# Patient Record
Sex: FEMALE | Race: WHITE | NOT HISPANIC OR LATINO | Employment: FULL TIME | URBAN - METROPOLITAN AREA
[De-identification: names, ages, dates, MRNs, and addresses within clinical notes are randomized per-mention and may not be internally consistent; named-entity substitution may affect disease eponyms.]

---

## 2017-04-04 ENCOUNTER — OFFICE VISIT (OUTPATIENT)
Dept: INTERNAL MEDICINE | Facility: CLINIC | Age: 47
End: 2017-04-04

## 2017-04-04 VITALS
BODY MASS INDEX: 22.02 KG/M2 | DIASTOLIC BLOOD PRESSURE: 74 MMHG | WEIGHT: 162.6 LBS | SYSTOLIC BLOOD PRESSURE: 118 MMHG | HEIGHT: 72 IN | HEART RATE: 69 BPM | OXYGEN SATURATION: 99 %

## 2017-04-04 DIAGNOSIS — R29.818 SUSPECTED SLEEP APNEA: ICD-10-CM

## 2017-04-04 DIAGNOSIS — Z00.00 ANNUAL PHYSICAL EXAM: Primary | ICD-10-CM

## 2017-04-04 DIAGNOSIS — R53.82 CHRONIC FATIGUE: ICD-10-CM

## 2017-04-04 LAB
ALBUMIN SERPL-MCNC: 4.6 G/DL (ref 3.2–4.8)
ALBUMIN/GLOB SERPL: 1.5 G/DL (ref 1.5–2.5)
ALP SERPL-CCNC: 52 U/L (ref 25–100)
ALT SERPL W P-5'-P-CCNC: 20 U/L (ref 7–40)
ANION GAP SERPL CALCULATED.3IONS-SCNC: 1 MMOL/L (ref 3–11)
AST SERPL-CCNC: 27 U/L (ref 0–33)
BASOPHILS # BLD AUTO: 0.02 10*3/MM3 (ref 0–0.2)
BASOPHILS NFR BLD AUTO: 0.3 % (ref 0–1)
BILIRUB BLD-MCNC: NEGATIVE MG/DL
BILIRUB SERPL-MCNC: 0.5 MG/DL (ref 0.3–1.2)
BUN BLD-MCNC: 15 MG/DL (ref 9–23)
BUN/CREAT SERPL: 21.4 (ref 7–25)
CALCIUM SPEC-SCNC: 9.8 MG/DL (ref 8.7–10.4)
CHLORIDE SERPL-SCNC: 109 MMOL/L (ref 99–109)
CLARITY, POC: CLEAR
CO2 SERPL-SCNC: 31 MMOL/L (ref 20–31)
COLOR UR: YELLOW
CREAT BLD-MCNC: 0.7 MG/DL (ref 0.6–1.3)
DEPRECATED RDW RBC AUTO: 50.9 FL (ref 37–54)
EOSINOPHIL # BLD AUTO: 0.15 10*3/MM3 (ref 0.1–0.3)
EOSINOPHIL NFR BLD AUTO: 2.4 % (ref 0–3)
ERYTHROCYTE [DISTWIDTH] IN BLOOD BY AUTOMATED COUNT: 14.9 % (ref 11.3–14.5)
GFR SERPL CREATININE-BSD FRML MDRD: 89 ML/MIN/1.73
GLOBULIN UR ELPH-MCNC: 3 GM/DL
GLUCOSE BLD-MCNC: 77 MG/DL (ref 70–100)
GLUCOSE UR STRIP-MCNC: NEGATIVE MG/DL
HCT VFR BLD AUTO: 38.1 % (ref 34.5–44)
HGB BLD-MCNC: 12.1 G/DL (ref 11.5–15.5)
IMM GRANULOCYTES # BLD: 0.01 10*3/MM3 (ref 0–0.03)
IMM GRANULOCYTES NFR BLD: 0.2 % (ref 0–0.6)
KETONES UR QL: NEGATIVE
LEUKOCYTE EST, POC: NEGATIVE
LYMPHOCYTES # BLD AUTO: 1.65 10*3/MM3 (ref 0.6–4.8)
LYMPHOCYTES NFR BLD AUTO: 26.7 % (ref 24–44)
MCH RBC QN AUTO: 29.4 PG (ref 27–31)
MCHC RBC AUTO-ENTMCNC: 31.8 G/DL (ref 32–36)
MCV RBC AUTO: 92.7 FL (ref 80–99)
MONOCYTES # BLD AUTO: 0.57 10*3/MM3 (ref 0–1)
MONOCYTES NFR BLD AUTO: 9.2 % (ref 0–12)
NEUTROPHILS # BLD AUTO: 3.77 10*3/MM3 (ref 1.5–8.3)
NEUTROPHILS NFR BLD AUTO: 61.2 % (ref 41–71)
NITRITE UR-MCNC: NEGATIVE MG/ML
PH UR: 7 [PH] (ref 5–8)
PLATELET # BLD AUTO: 246 10*3/MM3 (ref 150–450)
PMV BLD AUTO: 9.8 FL (ref 6–12)
POTASSIUM BLD-SCNC: 4.4 MMOL/L (ref 3.5–5.5)
PROT SERPL-MCNC: 7.6 G/DL (ref 5.7–8.2)
PROT UR STRIP-MCNC: NEGATIVE MG/DL
RBC # BLD AUTO: 4.11 10*6/MM3 (ref 3.89–5.14)
RBC # UR STRIP: ABNORMAL /UL
SODIUM BLD-SCNC: 141 MMOL/L (ref 132–146)
SP GR UR: 1.01 (ref 1–1.03)
TSH SERPL DL<=0.05 MIU/L-ACNC: 1.73 MIU/ML (ref 0.35–5.35)
UROBILINOGEN UR QL: NORMAL
WBC NRBC COR # BLD: 6.17 10*3/MM3 (ref 3.5–10.8)

## 2017-04-04 PROCEDURE — 80053 COMPREHEN METABOLIC PANEL: CPT | Performed by: HOSPITALIST

## 2017-04-04 PROCEDURE — 85025 COMPLETE CBC W/AUTO DIFF WBC: CPT | Performed by: HOSPITALIST

## 2017-04-04 PROCEDURE — 84443 ASSAY THYROID STIM HORMONE: CPT | Performed by: HOSPITALIST

## 2017-04-04 PROCEDURE — 99213 OFFICE O/P EST LOW 20 MIN: CPT | Performed by: HOSPITALIST

## 2017-04-04 PROCEDURE — 99396 PREV VISIT EST AGE 40-64: CPT | Performed by: HOSPITALIST

## 2017-04-04 PROCEDURE — 81003 URINALYSIS AUTO W/O SCOPE: CPT | Performed by: HOSPITALIST

## 2017-04-06 ENCOUNTER — TELEPHONE (OUTPATIENT)
Dept: INTERNAL MEDICINE | Facility: CLINIC | Age: 47
End: 2017-04-06

## 2017-04-06 NOTE — TELEPHONE ENCOUNTER
4/6/17    Pt called today because she said she rec'vd a call from Dr. Seymour's office trying to confirm her set-up to have her overnight sleep oximetry. I told the pt that it was the sleep study company that called her and they will call her back to set up a time for her to have it done.    Gave pt results of her recent labs - okay per Dr. Seymour.

## 2017-04-10 ENCOUNTER — TELEPHONE (OUTPATIENT)
Dept: INTERNAL MEDICINE | Facility: CLINIC | Age: 47
End: 2017-04-10

## 2017-04-10 DIAGNOSIS — G47.34 NOCTURNAL HYPOXEMIA: Primary | ICD-10-CM

## 2017-04-10 NOTE — TELEPHONE ENCOUNTER
----- Message from Rosenda Gandhi sent at 4/6/2017 10:59 AM EDT -----  PT NEEDS A CALLBACK REGARDING AN ORDER FOR A PULSE OX THAT DR SLAUGHTER WOULD LIKE HER TO GET.    PLEASE RETURN HER CALL @ [phone]

## 2017-06-08 ENCOUNTER — TELEPHONE (OUTPATIENT)
Dept: INTERNAL MEDICINE | Facility: CLINIC | Age: 47
End: 2017-06-08

## 2017-06-08 DIAGNOSIS — T14.8XXA PUNCTURE WOUND: Primary | ICD-10-CM

## 2017-06-08 RX ORDER — AMOXICILLIN AND CLAVULANATE POTASSIUM 875; 125 MG/1; MG/1
1 TABLET, FILM COATED ORAL 2 TIMES DAILY
Qty: 14 TABLET | Refills: 0 | Status: SHIPPED | OUTPATIENT
Start: 2017-06-08 | End: 2017-07-21

## 2017-06-08 NOTE — TELEPHONE ENCOUNTER
6/8/17      Called pt and she said this happened last night while pulling up her carpet and was stuck by nail from threshold.     It was her right thumb, no longer bleeding, pt has c/o thumb still being sore and having a tingling sensation.    Denies discoloration, swelling, and fever.    She wanted to know if she needed a tetanus vaccine?    Pt states she has a very high deductible and asked if there was something that could be called in or she could use OTC for this.    She was also inquiring about the test for the overnight sleep study. Pt says since the order was out in to be done at the hospital, it will be $5,000, she would have to pay over $4,000.    To have it done at home it would be $850.     She asked if she can just skip the study and have an order for a C-pap machine?

## 2017-06-08 NOTE — TELEPHONE ENCOUNTER
PATIENT WAS PULLING UP CARPET IN HER HOUSE AND THE NAIL BOARD THAT KEEPS CARPET DOWN STUCK HER IN HER THUMB THIS MORNING. LAST TETNIS SHOT WAS GIVEN TO HER 02/2009. PATIENT IS NOT SURE IF SHE NEEDS TO GET A BOOSTER SHOT. IT STUCK HER PRETTY GOOD AND BLEED LONGER THEN A SMALL STICK WOULD OF. SHE WOULD LIKE TO KNOW WHAT DR. SLAUGHTER WANTS HER TO DO. YOU CAN REACH HER BACK -[phone]

## 2017-06-08 NOTE — TELEPHONE ENCOUNTER
Can't have CPAP unless we have a sleep study report. sent in rx for amoxicillin/clavulanate. She needs a Tdap

## 2017-06-13 RX ORDER — DOXEPIN HYDROCHLORIDE 25 MG/1
25 CAPSULE ORAL DAILY
Qty: 30 CAPSULE | Refills: 5 | Status: SHIPPED | OUTPATIENT
Start: 2017-06-13 | End: 2017-11-16 | Stop reason: SDUPTHER

## 2017-07-21 ENCOUNTER — OFFICE VISIT (OUTPATIENT)
Dept: INTERNAL MEDICINE | Facility: CLINIC | Age: 47
End: 2017-07-21

## 2017-07-21 VITALS
SYSTOLIC BLOOD PRESSURE: 114 MMHG | BODY MASS INDEX: 21.06 KG/M2 | DIASTOLIC BLOOD PRESSURE: 60 MMHG | WEIGHT: 164 LBS | OXYGEN SATURATION: 100 % | HEART RATE: 74 BPM

## 2017-07-21 DIAGNOSIS — G44.229 CHRONIC TENSION-TYPE HEADACHE, NOT INTRACTABLE: Primary | ICD-10-CM

## 2017-07-21 DIAGNOSIS — G47.34 NOCTURNAL HYPOXIA: ICD-10-CM

## 2017-07-21 PROCEDURE — 99213 OFFICE O/P EST LOW 20 MIN: CPT | Performed by: NURSE PRACTITIONER

## 2017-07-21 RX ORDER — IBUPROFEN 800 MG/1
800 TABLET ORAL EVERY 8 HOURS PRN
Qty: 90 TABLET | Refills: 1 | Status: SHIPPED | OUTPATIENT
Start: 2017-07-21

## 2017-07-24 NOTE — PROGRESS NOTES
"Chief complaint: headaches      HPI  Eleanor Figueroa is a 47 y.o. female who presents for follow-up of headaches that she believes is related to sleep apnea.  The headaches occur immediately upon waking and continue throughout the day, along with severe fatigue.  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  She states, \"I go to bed with a headache and wake up with a headache.\"  She saw Dr. Seymour for this complaint and was given a pulse ox to check her nighttime oximetry.  She states that it showed her oxygen dropping below 90% several times during the night.  She would like to have a sleep study and hopefully get some relief before school begins in August.  She has an appointment for a consult at [facility], but it is in November, she feels that she cannot wait that long.    Past Medical History:   Diagnosis Date   • Acute sinusitis    • Breast mass    • Furuncle of neck    • Seasonal affective disorder        Family History   Problem Relation Age of Onset   • Cancer Mother    • Cancer Father    • COPD Paternal Grandmother        Social History     Social History   • Marital status:      Spouse name: N/A   • Number of children: N/A   • Years of education: N/A     Occupational History   • Not on file.     Social History Main Topics   • Smoking status: Never Smoker   • Smokeless tobacco: Never Used   • Alcohol use No   • Drug use: No   • Sexual activity: Not on file     Other Topics Concern   • Not on file     Social History Narrative       Review of Systems   Constitutional: Positive for fatigue. Negative for activity change and appetite change.   Respiratory: Negative for cough, chest tightness and shortness of breath.    Cardiovascular: Negative for chest pain, palpitations and leg swelling.   Neurological: Positive for headaches. Negative for dizziness and light-headedness.   All other systems " reviewed and are negative.        Vital Signs  /60  Pulse 74  Wt 164 lb (74.4 kg)  SpO2 100%  BMI 21.06 kg/m2    Physical Exam   Constitutional: She is oriented to person, place, and time. She appears well-developed and well-nourished.   HENT:   Head: Normocephalic and atraumatic.   Eyes: Conjunctivae are normal.   Cardiovascular: Normal rate, regular rhythm and normal heart sounds.    Pulmonary/Chest: Effort normal and breath sounds normal.   Neurological: She is alert and oriented to person, place, and time.   Skin: Skin is warm, dry and intact.   Vitals reviewed.        Results for orders placed or performed in visit on 04/04/17   Comprehensive metabolic panel   Result Value Ref Range    Glucose 77 70 - 100 mg/dL    BUN 15 9 - 23 mg/dL    Creatinine 0.70 0.60 - 1.30 mg/dL    Sodium 141 132 - 146 mmol/L    Potassium 4.4 3.5 - 5.5 mmol/L    Chloride 109 99 - 109 mmol/L    CO2 31.0 20.0 - 31.0 mmol/L    Calcium 9.8 8.7 - 10.4 mg/dL    Total Protein 7.6 5.7 - 8.2 g/dL    Albumin 4.60 3.20 - 4.80 g/dL    ALT (SGPT) 20 7 - 40 U/L    AST (SGOT) 27 0 - 33 U/L    Alkaline Phosphatase 52 25 - 100 U/L    Total Bilirubin 0.5 0.3 - 1.2 mg/dL    eGFR Non African Amer 89 >60 mL/min/1.73    Globulin 3.0 gm/dL    A/G Ratio 1.5 1.5 - 2.5 g/dL    BUN/Creatinine Ratio 21.4 7.0 - 25.0    Anion Gap 1.0 (L) 3.0 - 11.0 mmol/L   TSH   Result Value Ref Range    TSH 1.734 0.350 - 5.350 mIU/mL   CBC Auto Differential   Result Value Ref Range    WBC 6.17 3.50 - 10.80 10*3/mm3    RBC 4.11 3.89 - 5.14 10*6/mm3    Hemoglobin 12.1 11.5 - 15.5 g/dL    Hematocrit 38.1 34.5 - 44.0 %    MCV 92.7 80.0 - 99.0 fL    MCH 29.4 27.0 - 31.0 pg    MCHC 31.8 (L) 32.0 - 36.0 g/dL    RDW 14.9 (H) 11.3 - 14.5 %    RDW-SD 50.9 37.0 - 54.0 fl    MPV 9.8 6.0 - 12.0 fL    Platelets 246 150 - 450 10*3/mm3    Neutrophil % 61.2 41.0 - 71.0 %    Lymphocyte % 26.7 24.0 - 44.0 %    Monocyte % 9.2 0.0 - 12.0 %    Eosinophil % 2.4 0.0 - 3.0 %    Basophil % 0.3  0.0 - 1.0 %    Immature Grans % 0.2 0.0 - 0.6 %    Neutrophils, Absolute 3.77 1.50 - 8.30 10*3/mm3    Lymphocytes, Absolute 1.65 0.60 - 4.80 10*3/mm3    Monocytes, Absolute 0.57 0.00 - 1.00 10*3/mm3    Eosinophils, Absolute 0.15 0.10 - 0.30 10*3/mm3    Basophils, Absolute 0.02 0.00 - 0.20 10*3/mm3    Immature Grans, Absolute 0.01 0.00 - 0.03 10*3/mm3   POCT urinalysis dipstick, automated   Result Value Ref Range    Color Yellow Yellow, Straw, Dark Yellow, [place]oid[place]    Clarity, UA Clear Clear    Glucose, UA Negative Negative, 1000 mg/dL (3+) mg/dL    Bilirubin Negative Negative    Ketones, UA Negative Negative    Specific Gravity  1.010 1.005 - 1.030    Blood,  Luigi/ul (A) Negative    pH, Urine 7.0 5.0 - 8.0    Protein, POC Negative Negative mg/dL    Urobilinogen, UA Normal Normal    Leukocytes Negative Negative    Nitrite, UA Negative Negative       Office Visit on 04/04/2017   Component Date Value Ref Range Status   • Color 04/04/2017 Yellow  Yellow, Straw, Dark Yellow, [place] Final   • Clarity, UA 04/04/2017 Clear  Clear Final   • Glucose, UA 04/04/2017 Negative  Negative, 1000 mg/dL (3+) mg/dL Final   • Bilirubin 04/04/2017 Negative  Negative Final   • Ketones, UA 04/04/2017 Negative  Negative Final   • Specific Gravity  04/04/2017 1.010  1.005 - 1.030 Final   • Blood, UA 04/04/2017 250 Luigi/ul* Negative Final   • pH, Urine 04/04/2017 7.0  5.0 - 8.0 Final   • Protein, POC 04/04/2017 Negative  Negative mg/dL Final   • Urobilinogen, UA 04/04/2017 Normal  Normal Final   • Leukocytes 04/04/2017 Negative  Negative Final   • Nitrite, UA 04/04/2017 Negative  Negative Final   • Glucose 04/04/2017 77  70 - 100 mg/dL Final   • BUN 04/04/2017 15  9 - 23 mg/dL Final   • Creatinine 04/04/2017 0.70  0.60 - 1.30 mg/dL Final   • Sodium 04/04/2017 141  132 - 146 mmol/L Final   • Potassium 04/04/2017 4.4  3.5 - 5.5 mmol/L Final   • Chloride 04/04/2017 109  99 - 109 mmol/L Final   • CO2 04/04/2017 31.0  20.0 - 31.0 mmol/L Final   •  Calcium 04/04/2017 9.8  8.7 - 10.4 mg/dL Final   • Total Protein 04/04/2017 7.6  5.7 - 8.2 g/dL Final   • Albumin 04/04/2017 4.60  3.20 - 4.80 g/dL Final   • ALT (SGPT) 04/04/2017 20  7 - 40 U/L Final   • AST (SGOT) 04/04/2017 27  0 - 33 U/L Final   • Alkaline Phosphatase 04/04/2017 52  25 - 100 U/L Final   • Total Bilirubin 04/04/2017 0.5  0.3 - 1.2 mg/dL Final   • eGFR Non African Amer 04/04/2017 89  >60 mL/min/1.73 Final   • Globulin 04/04/2017 3.0  gm/dL Final   • A/G Ratio 04/04/2017 1.5  1.5 - 2.5 g/dL Final   • BUN/Creatinine Ratio 04/04/2017 21.4  7.0 - 25.0 Final   • Anion Gap 04/04/2017 1.0* 3.0 - 11.0 mmol/L Final   • TSH 04/04/2017 1.734  0.350 - 5.350 mIU/mL Final   • WBC 04/04/2017 6.17  3.50 - 10.80 10*3/mm3 Final   • RBC 04/04/2017 4.11  3.89 - 5.14 10*6/mm3 Final   • Hemoglobin 04/04/2017 12.1  11.5 - 15.5 g/dL Final   • Hematocrit 04/04/2017 38.1  34.5 - 44.0 % Final   • MCV 04/04/2017 92.7  80.0 - 99.0 fL Final   • MCH 04/04/2017 29.4  27.0 - 31.0 pg Final   • MCHC 04/04/2017 31.8* 32.0 - 36.0 g/dL Final   • RDW 04/04/2017 14.9* 11.3 - 14.5 % Final   • RDW-SD 04/04/2017 50.9  37.0 - 54.0 fl Final   • MPV 04/04/2017 9.8  6.0 - 12.0 fL Final   • Platelets 04/04/2017 246  150 - 450 10*3/mm3 Final   • Neutrophil % 04/04/2017 61.2  41.0 - 71.0 % Final   • Lymphocyte % 04/04/2017 26.7  24.0 - 44.0 % Final   • Monocyte % 04/04/2017 9.2  0.0 - 12.0 % Final   • Eosinophil % 04/04/2017 2.4  0.0 - 3.0 % Final   • Basophil % 04/04/2017 0.3  0.0 - 1.0 % Final   • Immature Grans % 04/04/2017 0.2  0.0 - 0.6 % Final   • Neutrophils, Absolute 04/04/2017 3.77  1.50 - 8.30 10*3/mm3 Final   • Lymphocytes, Absolute 04/04/2017 1.65  0.60 - 4.80 10*3/mm3 Final   • Monocytes, Absolute 04/04/2017 0.57  0.00 - 1.00 10*3/mm3 Final   • Eosinophils, Absolute 04/04/2017 0.15  0.10 - 0.30 10*3/mm3 Final   • Basophils, Absolute 04/04/2017 0.02  0.00 - 0.20 10*3/mm3 Final   • Immature Grans, Absolute 04/04/2017 0.01  0.00 - 0.03  10*3/mm3 Final       Assessment/Plan     1. Chronic tension-type headache, not intractable  -Uncontrolled  -Will obtain sleep study at sleep center [facility] patient's [facility]  - Ambulatory Referral to Sleep Medicine  - ibuprofen (ADVIL,MOTRIN) 800 MG tablet; Take 1 tablet by mouth Every 8 (Eight) Hours As Needed for Mild Pain (1-3).  Dispense: 90 tablet; Refill: 1    2. Nocturnal hypoxia  -Unknown control  - Ambulatory Referral to Sleep Medicine      Return for Annual physical:  April. or before if needed.    Future Appointments  Date Time Provider Department Center   10/10/2017 11:00 AM Marco A Chicas MD, FAAN [facility] None   10/20/2017 1:15 PM Alysha Goncalves MD [facility] None   11/1/2017 3:00 PM Troy Connor MD [facility] None       Kyara Rodriguez, APRN

## 2017-11-13 ENCOUNTER — FLU SHOT (OUTPATIENT)
Dept: INTERNAL MEDICINE | Facility: CLINIC | Age: 47
End: 2017-11-13

## 2017-11-13 PROCEDURE — 90686 IIV4 VACC NO PRSV 0.5 ML IM: CPT | Performed by: NURSE PRACTITIONER

## 2017-11-13 PROCEDURE — 90471 IMMUNIZATION ADMIN: CPT | Performed by: NURSE PRACTITIONER

## 2017-11-16 RX ORDER — DOXEPIN HYDROCHLORIDE 25 MG/1
25 CAPSULE ORAL DAILY
Qty: 30 CAPSULE | Refills: 5 | Status: SHIPPED | OUTPATIENT
Start: 2017-11-16

## 2020-11-25 PROCEDURE — U0004 COV-19 TEST NON-CDC HGH THRU: HCPCS | Performed by: NURSE PRACTITIONER
